# Patient Record
Sex: FEMALE | Race: WHITE | NOT HISPANIC OR LATINO | URBAN - METROPOLITAN AREA
[De-identification: names, ages, dates, MRNs, and addresses within clinical notes are randomized per-mention and may not be internally consistent; named-entity substitution may affect disease eponyms.]

---

## 2023-02-14 ENCOUNTER — OFFICE VISIT (OUTPATIENT)
Dept: URGENT CARE | Facility: CLINIC | Age: 38
End: 2023-02-14

## 2023-02-14 VITALS
BODY MASS INDEX: 29.82 KG/M2 | OXYGEN SATURATION: 98 % | WEIGHT: 190 LBS | HEIGHT: 67 IN | TEMPERATURE: 97 F | SYSTOLIC BLOOD PRESSURE: 138 MMHG | HEART RATE: 102 BPM | DIASTOLIC BLOOD PRESSURE: 96 MMHG | RESPIRATION RATE: 18 BRPM

## 2023-02-14 DIAGNOSIS — J02.0 STREP PHARYNGITIS: Primary | ICD-10-CM

## 2023-02-14 LAB — S PYO AG THROAT QL: POSITIVE

## 2023-02-14 RX ORDER — AMOXICILLIN 500 MG/1
500 CAPSULE ORAL EVERY 12 HOURS SCHEDULED
Qty: 20 CAPSULE | Refills: 0 | Status: SHIPPED | OUTPATIENT
Start: 2023-02-14 | End: 2023-02-24

## 2023-02-14 NOTE — PROGRESS NOTES
[address]        NAME: Patricia Wick is a 30 y o  female  : 1985    MRN: [id number]  DATE: 2023  TIME: 9:29 AM    Assessment and Plan   Strep pharyngitis [J02 0]  1  Strep pharyngitis  POCT rapid strepA    amoxicillin (AMOXIL) 500 mg capsule            Patient Instructions   Take antibiotic as prescribed  Complete full dose of antibiotics even if symptoms begin to improve or resolve  This is very contagious; do not share drinks or food with others  Replace your toothbrush in 1-2 days to prevent reinfection  Use OTC Tylenol for fever  Your symptoms should begin to improve over the next couple days  Follow-up with your PCP in 3-5 days if symptoms worsen or do not improve  Go to ER if symptoms become severe  Follow up with PCP in 3-5 days  Proceed to  ER if symptoms worsen  Chief Complaint     Chief Complaint   Patient presents with   • Sore Throat     Starting 2 days ago denies any fevers         History of Present Illness       Pt is a 29 yo F with no sig PMHx presents to the office c/o sore throat and mild congestion x 2 days  Pain is rated 6/10  She denies fever, chills, cough, SOB, CP, nausea, vomiting, abdominal pain or rashes  Review of Systems   Review of Systems   Constitutional: Negative for chills and fever  HENT: Positive for congestion and sore throat  Respiratory: Negative for cough and shortness of breath  Cardiovascular: Negative for chest pain and palpitations  Gastrointestinal: Negative for abdominal pain, diarrhea, nausea and vomiting  Musculoskeletal: Negative for myalgias  Neurological: Negative for dizziness, light-headedness and headaches           Current Medications       Current Outpatient Medications:   •  amoxicillin (AMOXIL) 500 mg capsule, Take 1 capsule (500 mg total) by mouth every 12 (twelve) hours for 10 days, Disp: 20 capsule, Rfl: 0    Current Allergies     Allergies as of 2023   • (No Known Allergies) The following portions of the patient's history were reviewed and updated as appropriate: allergies, current medications, past family history, past medical history, past social history, past surgical history and problem list      Past Medical History:   Diagnosis Date   • Known health problems: none        Past Surgical History:   Procedure Laterality Date   • NO PAST SURGERIES         Family History   Problem Relation Age of Onset   • No Known Problems Mother    • No Known Problems Father          Medications have been verified  Objective   /96   Pulse 102   Temp (!) 97 °F (36 1 °C)   Resp 18   Ht 5' 7" (1 702 m)   Wt 86 2 kg (190 lb)   LMP 02/04/2023   SpO2 98%   BMI 29 76 kg/m²   Patient's last menstrual period was 02/04/2023  Physical Exam     Physical Exam  Vitals and nursing note reviewed  Constitutional:       Appearance: Normal appearance  She is well-developed  HENT:      Head: Normocephalic and atraumatic  Right Ear: Tympanic membrane, ear canal and external ear normal       Left Ear: Tympanic membrane, ear canal and external ear normal       Nose: Nose normal       Mouth/Throat:      Pharynx: Uvula midline  Eyes:      General: Lids are normal       Conjunctiva/sclera: Conjunctivae normal       Pupils: Pupils are equal, round, and reactive to light  Cardiovascular:      Rate and Rhythm: Normal rate and regular rhythm  Pulses: Normal pulses  Heart sounds: Normal heart sounds  No murmur heard  No friction rub  No gallop  Pulmonary:      Effort: Pulmonary effort is normal       Breath sounds: Normal breath sounds  No wheezing, rhonchi or rales  Musculoskeletal:         General: Normal range of motion  Cervical back: Neck supple  Lymphadenopathy:      Cervical: Cervical adenopathy present  Skin:     General: Skin is warm and dry  Capillary Refill: Capillary refill takes less than 2 seconds     Neurological:      Mental Status: She is alert        POC rapid strep POSITIVE

## 2023-04-03 ENCOUNTER — OFFICE VISIT (OUTPATIENT)
Dept: URGENT CARE | Facility: CLINIC | Age: 38
End: 2023-04-03

## 2023-04-03 VITALS
WEIGHT: 190 LBS | OXYGEN SATURATION: 100 % | RESPIRATION RATE: 18 BRPM | BODY MASS INDEX: 29.82 KG/M2 | SYSTOLIC BLOOD PRESSURE: 137 MMHG | DIASTOLIC BLOOD PRESSURE: 96 MMHG | TEMPERATURE: 98.1 F | HEART RATE: 138 BPM | HEIGHT: 67 IN

## 2023-04-03 DIAGNOSIS — J06.9 VIRAL URI: Primary | ICD-10-CM

## 2023-04-03 DIAGNOSIS — B30.9 VIRAL CONJUNCTIVITIS: ICD-10-CM

## 2023-04-03 LAB — S PYO AG THROAT QL: NEGATIVE

## 2023-04-03 RX ORDER — PREDNISONE 10 MG/1
10 TABLET ORAL DAILY
Qty: 21 TABLET | Refills: 0 | Status: SHIPPED | OUTPATIENT
Start: 2023-04-03

## 2023-04-03 NOTE — PROGRESS NOTES
"  [address]        NAME: Charanjit Khan is a 41 y o  female  : 1985    MRN: [id number]  DATE: April 3, 2023  TIME: 9:38 AM    Assessment and Plan   Viral URI [J06 9]  1  Viral URI  POCT rapid strepA    Throat culture    predniSONE 10 mg tablet      2  Viral conjunctivitis              Patient Instructions   Drink plenty of fluids  May use over the counter cold medications for symptomatic treatment  Do not use medications with Pseudoephedrine or Phenylphrine if you have high blood pressure because it may worsen your blood pressure  Follow up with your PCP in 3-5 days if your symptoms do not improve or if you have any concerns  Go to the ER if symptoms become severe  Follow up with PCP in 3-5 days  Proceed to  ER if symptoms worsen  Chief Complaint     Chief Complaint   Patient presents with   • Cold Like Symptoms     Sore throat \"hard to swallow\", sinus congestion with pain and pressure, right side ear pain, and right eye reddened starting saturday         History of Present Illness       Patient is a 79-year-old female with no sniffing a past medical history presents the office planing of congestion, sinus pain and pressure, sore throat, throat swelling, and right ear pain for 3 days  Reports her right eye is slightly red, watery, and glossy  Pain is rated 6 out of 10  She is strep in the past and states it feels similar  Review of Systems   Review of Systems   Constitutional: Negative for fever  HENT: Positive for congestion, postnasal drip, rhinorrhea and sore throat  Eyes: Positive for discharge and redness  Negative for photophobia, itching and visual disturbance  Respiratory: Negative for cough and shortness of breath  Cardiovascular: Negative for chest pain and palpitations  Gastrointestinal: Negative for abdominal pain, diarrhea, nausea and vomiting  Skin: Negative for rash  Neurological: Negative for dizziness, light-headedness and headaches           Current " "Medications       Current Outpatient Medications:   •  predniSONE 10 mg tablet, Take 1 tablet (10 mg total) by mouth daily Take 6 on day 1, take 5 on day 2, take 4 on day 3, take 3 on day 4, take 2 on day 5, take 1 on day 6 , Disp: 21 tablet, Rfl: 0    Current Allergies     Allergies as of 04/03/2023   • (No Known Allergies)            The following portions of the patient's history were reviewed and updated as appropriate: allergies, current medications, past family history, past medical history, past social history, past surgical history and problem list      Past Medical History:   Diagnosis Date   • Known health problems: none        Past Surgical History:   Procedure Laterality Date   • NO PAST SURGERIES         Family History   Problem Relation Age of Onset   • No Known Problems Mother    • No Known Problems Father          Medications have been verified  Objective   /96   Pulse (!) 138   Temp 98 1 °F (36 7 °C)   Resp 18   Ht 5' 7\" (1 702 m)   Wt 86 2 kg (190 lb)   LMP 03/24/2023   SpO2 100%   Breastfeeding No   BMI 29 76 kg/m²   Patient's last menstrual period was 03/24/2023  Physical Exam     Physical Exam  Vitals and nursing note reviewed  Constitutional:       Appearance: Normal appearance  She is well-developed  HENT:      Head: Normocephalic and atraumatic  Right Ear: Tympanic membrane, ear canal and external ear normal       Left Ear: Tympanic membrane, ear canal and external ear normal       Nose: Congestion and rhinorrhea present  Mouth/Throat:      Pharynx: Uvula midline  No pharyngeal swelling or posterior oropharyngeal erythema  Tonsils: No tonsillar exudate or tonsillar abscesses  Eyes:      General: Lids are normal          Right eye: Discharge (watery) present  No foreign body or hordeolum  Extraocular Movements: Extraocular movements intact  Conjunctiva/sclera:      Right eye: Right conjunctiva is injected        Pupils: Pupils are " equal, round, and reactive to light  Cardiovascular:      Rate and Rhythm: Regular rhythm  Tachycardia present  Pulses: Normal pulses  Heart sounds: Normal heart sounds  No murmur heard  No friction rub  No gallop  Pulmonary:      Effort: Pulmonary effort is normal       Breath sounds: Normal breath sounds  No wheezing, rhonchi or rales  Musculoskeletal:         General: Normal range of motion  Cervical back: Neck supple  Lymphadenopathy:      Cervical: Cervical adenopathy present  Skin:     General: Skin is warm and dry  Capillary Refill: Capillary refill takes less than 2 seconds  Neurological:      Mental Status: She is alert

## 2023-04-05 LAB — BACTERIA THROAT CULT: NORMAL
